# Patient Record
Sex: MALE | Race: WHITE | NOT HISPANIC OR LATINO | Employment: FULL TIME | URBAN - METROPOLITAN AREA
[De-identification: names, ages, dates, MRNs, and addresses within clinical notes are randomized per-mention and may not be internally consistent; named-entity substitution may affect disease eponyms.]

---

## 2020-11-13 ENCOUNTER — OFFICE VISIT (OUTPATIENT)
Dept: URGENT CARE | Facility: CLINIC | Age: 25
End: 2020-11-13
Payer: OTHER GOVERNMENT

## 2020-11-13 VITALS
HEART RATE: 70 BPM | RESPIRATION RATE: 18 BRPM | TEMPERATURE: 97.9 F | HEIGHT: 72 IN | OXYGEN SATURATION: 98 % | BODY MASS INDEX: 27.77 KG/M2 | WEIGHT: 205 LBS

## 2020-11-13 DIAGNOSIS — J06.9 UPPER RESPIRATORY TRACT INFECTION, UNSPECIFIED TYPE: Primary | ICD-10-CM

## 2020-11-13 DIAGNOSIS — Z20.822 EXPOSURE TO COVID-19 VIRUS: ICD-10-CM

## 2020-11-13 PROCEDURE — U0003 INFECTIOUS AGENT DETECTION BY NUCLEIC ACID (DNA OR RNA); SEVERE ACUTE RESPIRATORY SYNDROME CORONAVIRUS 2 (SARS-COV-2) (CORONAVIRUS DISEASE [COVID-19]), AMPLIFIED PROBE TECHNIQUE, MAKING USE OF HIGH THROUGHPUT TECHNOLOGIES AS DESCRIBED BY CMS-2020-01-R: HCPCS | Performed by: PHYSICIAN ASSISTANT

## 2020-11-13 PROCEDURE — G0382 LEV 3 HOSP TYPE B ED VISIT: HCPCS | Performed by: PHYSICIAN ASSISTANT

## 2020-11-15 LAB — SARS-COV-2 RNA SPEC QL NAA+PROBE: NOT DETECTED

## 2020-12-10 ENCOUNTER — NURSE TRIAGE (OUTPATIENT)
Dept: OTHER | Facility: OTHER | Age: 25
End: 2020-12-10

## 2020-12-10 DIAGNOSIS — Z11.59 SPECIAL SCREENING EXAMINATION FOR UNSPECIFIED VIRAL DISEASE: ICD-10-CM

## 2020-12-10 DIAGNOSIS — Z11.59 SPECIAL SCREENING EXAMINATION FOR UNSPECIFIED VIRAL DISEASE: Primary | ICD-10-CM

## 2020-12-10 PROCEDURE — U0003 INFECTIOUS AGENT DETECTION BY NUCLEIC ACID (DNA OR RNA); SEVERE ACUTE RESPIRATORY SYNDROME CORONAVIRUS 2 (SARS-COV-2) (CORONAVIRUS DISEASE [COVID-19]), AMPLIFIED PROBE TECHNIQUE, MAKING USE OF HIGH THROUGHPUT TECHNOLOGIES AS DESCRIBED BY CMS-2020-01-R: HCPCS | Performed by: FAMILY MEDICINE

## 2020-12-11 LAB — SARS-COV-2 RNA SPEC QL NAA+PROBE: DETECTED

## 2020-12-12 ENCOUNTER — TELEPHONE (OUTPATIENT)
Dept: INTERNAL MEDICINE CLINIC | Facility: CLINIC | Age: 25
End: 2020-12-12

## 2020-12-12 NOTE — RESULT ENCOUNTER NOTE
I spoke with Jacob Epperson and let him know that his COVID-19 swab was positive  Continue symptomatic treatment  Advised he implement home isolation measures including      Staying home  Stay in a specific "sick room" or area and away from other people or animals, including pets  Wear a mask when leaving your room  Use a separate bathroom, if available  Wipe down all commonly touched surfaces with household   Please schedule follow up video visit next week

## 2022-04-19 ENCOUNTER — HOSPITAL ENCOUNTER (EMERGENCY)
Facility: HOSPITAL | Age: 27
Discharge: HOME | End: 2022-04-19
Attending: EMERGENCY MEDICINE | Admitting: EMERGENCY MEDICINE
Payer: OTHER GOVERNMENT

## 2022-04-19 VITALS
TEMPERATURE: 97.3 F | RESPIRATION RATE: 17 BRPM | HEIGHT: 72 IN | WEIGHT: 197 LBS | HEART RATE: 75 BPM | OXYGEN SATURATION: 97 % | BODY MASS INDEX: 26.68 KG/M2 | SYSTOLIC BLOOD PRESSURE: 147 MMHG | DIASTOLIC BLOOD PRESSURE: 92 MMHG

## 2022-04-19 DIAGNOSIS — L73.9 FOLLICULITIS: Primary | ICD-10-CM

## 2022-04-19 PROCEDURE — 99284 EMERGENCY DEPT VISIT MOD MDM: CPT | Performed by: EMERGENCY MEDICINE

## 2022-04-19 PROCEDURE — 99282 EMERGENCY DEPT VISIT SF MDM: CPT

## 2022-04-19 RX ORDER — CEPHALEXIN 500 MG/1
500 CAPSULE ORAL EVERY 12 HOURS SCHEDULED
Qty: 14 CAPSULE | Refills: 0 | Status: SHIPPED | OUTPATIENT
Start: 2022-04-19 | End: 2022-04-26

## 2022-04-19 NOTE — ED PROVIDER NOTES
History  Chief Complaint   Patient presents with    Mass     pt c/o painful lump on chin for past 3 days  denies drainage/fevers     Patient complains of lump on shin since Sunday (3 days ago)  No pain there, no drainage  No redness or streaking  No fevers  History provided by:  Patient   used: No    Medical Problem  Location:  Right chin  Quality:  Lump  Severity:  Mild  Onset quality:  Gradual  Duration:  3 days  Timing:  Constant  Progression:  Unchanged  Chronicity:  New  Relieved by:  Nothing  Worsened by:  Nothing  Ineffective treatments:  None tried  Associated symptoms: no abdominal pain, no chest pain, no congestion, no cough, no diarrhea, no ear pain, no fatigue, no fever, no headaches, no loss of consciousness, no myalgias, no nausea, no rash, no rhinorrhea, no shortness of breath, no sore throat, no vomiting and no wheezing        None       Past Medical History:   Diagnosis Date    Known health problems: none        Past Surgical History:   Procedure Laterality Date    NO PAST SURGERIES         Family History   Problem Relation Age of Onset    No Known Problems Mother     No Known Problems Father      I have reviewed and agree with the history as documented  E-Cigarette/Vaping    E-Cigarette Use Current Every Day User      E-Cigarette/Vaping Substances    Nicotine Yes     THC No     CBD No     Flavoring Yes     Other No     Unknown No      Social History     Tobacco Use    Smoking status: Never Smoker    Smokeless tobacco: Never Used   Vaping Use    Vaping Use: Every day    Substances: Nicotine, Flavoring   Substance Use Topics    Alcohol use: Not Currently    Drug use: Not Currently       Review of Systems   Constitutional: Negative for chills, fatigue and fever  HENT: Negative for congestion, ear pain, hearing loss, rhinorrhea, sore throat, trouble swallowing and voice change  Eyes: Negative for pain and discharge     Respiratory: Negative for cough, shortness of breath and wheezing  Cardiovascular: Negative for chest pain and palpitations  Gastrointestinal: Negative for abdominal pain, blood in stool, constipation, diarrhea, nausea and vomiting  Genitourinary: Negative for dysuria, flank pain, frequency and hematuria  Musculoskeletal: Negative for joint swelling, myalgias, neck pain and neck stiffness  Skin: Negative for rash and wound  Neurological: Negative for dizziness, seizures, loss of consciousness, syncope, facial asymmetry and headaches  Psychiatric/Behavioral: Negative for hallucinations, self-injury and suicidal ideas  All other systems reviewed and are negative  Physical Exam  Physical Exam  Vitals and nursing note reviewed  Constitutional:       General: He is not in acute distress  Appearance: Normal appearance  He is well-developed  He is not ill-appearing or diaphoretic  HENT:      Head: Normocephalic and atraumatic  Right Ear: External ear normal       Left Ear: External ear normal    Eyes:      General: No scleral icterus  Right eye: No discharge  Left eye: No discharge  Extraocular Movements: Extraocular movements intact  Conjunctiva/sclera: Conjunctivae normal    Pulmonary:      Effort: Pulmonary effort is normal  No respiratory distress  Musculoskeletal:         General: No swelling or deformity  Normal range of motion  Cervical back: Normal range of motion and neck supple  Skin:     General: Skin is dry  Coloration: Skin is not jaundiced or pale  Findings: No rash  Comments: Local inflammation and swelling in the right shin where there is noted to be changes consistent with folliculitis  No fluctuant collection noted  No drainage noted  No redness or warmth  Neurological:      General: No focal deficit present  Mental Status: He is alert and oriented to person, place, and time  Cranial Nerves: No cranial nerve deficit        Motor: No weakness  Coordination: Coordination normal       Gait: Gait normal    Psychiatric:         Mood and Affect: Mood normal          Behavior: Behavior normal          Thought Content: Thought content normal          Judgment: Judgment normal          Vital Signs  ED Triage Vitals [04/19/22 1642]   Temperature Pulse Respirations Blood Pressure SpO2   (!) 97 3 °F (36 3 °C) 75 17 147/92 97 %      Temp Source Heart Rate Source Patient Position - Orthostatic VS BP Location FiO2 (%)   Temporal Monitor Sitting Right arm --      Pain Score       No Pain           Vitals:    04/19/22 1642   BP: 147/92   Pulse: 75   Patient Position - Orthostatic VS: Sitting         Visual Acuity      ED Medications  Medications - No data to display    Diagnostic Studies  Results Reviewed     None                 No orders to display              Procedures  Procedures         ED Course                                             MDM  Number of Diagnoses or Management Options  Diagnosis management comments: No indication at this time for incision and drainage  Will recommend oral antibiotic, warm compresses, outpatient follow-up as needed with primary care physician and or surgery  Disposition  Final diagnoses: Folliculitis     Time reflects when diagnosis was documented in both MDM as applicable and the Disposition within this note     Time User Action Codes Description Comment    4/19/2022  4:58 PM Jaimie Rodriges Add [J22 7] Folliculitis       ED Disposition     ED Disposition Condition Date/Time Comment    Discharge Stable Tue Apr 19, 2022  4:58 PM Shabbir Perez discharge to home/self care              Follow-up Information     Follow up With Specialties Details Why Contact Info    Your primary care physician   As needed     Sabino Leon DO General Surgery  As needed [address]  [phone]            Patient's Medications   Discharge Prescriptions    CEPHALEXIN (KEFLEX) 500 MG CAPSULE    Take 1 capsule (500 mg total) by mouth every 12 (twelve) hours for 7 days       Start Date: 4/19/2022 End Date: 4/26/2022       Order Dose: 500 mg       Quantity: 14 capsule    Refills: 0       No discharge procedures on file      PDMP Review     None          ED Provider  Electronically Signed by           Renetta Prescott MD  04/19/22 3056

## 2022-11-29 ENCOUNTER — HOSPITAL ENCOUNTER (EMERGENCY)
Facility: HOSPITAL | Age: 27
Discharge: HOME | End: 2022-11-29
Attending: EMERGENCY MEDICINE

## 2022-11-29 ENCOUNTER — APPOINTMENT (OUTPATIENT)
Dept: RADIOLOGY | Facility: HOSPITAL | Age: 27
End: 2022-11-29

## 2022-11-29 ENCOUNTER — TELEPHONE (OUTPATIENT)
Dept: PAIN MEDICINE | Facility: CLINIC | Age: 27
End: 2022-11-29

## 2022-11-29 VITALS
HEIGHT: 72 IN | OXYGEN SATURATION: 98 % | TEMPERATURE: 96.8 F | DIASTOLIC BLOOD PRESSURE: 90 MMHG | HEART RATE: 90 BPM | SYSTOLIC BLOOD PRESSURE: 135 MMHG | RESPIRATION RATE: 18 BRPM | BODY MASS INDEX: 26.68 KG/M2 | WEIGHT: 197 LBS

## 2022-11-29 DIAGNOSIS — M46.1 SACROILIITIS (HCC): Primary | ICD-10-CM

## 2022-11-29 DIAGNOSIS — M54.50 LOWER BACK PAIN: ICD-10-CM

## 2022-11-29 RX ORDER — PREDNISONE 20 MG/1
40 TABLET ORAL DAILY
Qty: 10 TABLET | Refills: 0 | Status: SHIPPED | OUTPATIENT
Start: 2022-11-29 | End: 2022-12-04

## 2022-11-29 RX ORDER — METHOCARBAMOL 500 MG/1
500 TABLET, FILM COATED ORAL 2 TIMES DAILY
Qty: 20 TABLET | Refills: 0 | Status: SHIPPED | OUTPATIENT
Start: 2022-11-29

## 2022-11-29 RX ORDER — LIDOCAINE 50 MG/G
1 PATCH TOPICAL DAILY
Qty: 7 PATCH | Refills: 0 | Status: SHIPPED | OUTPATIENT
Start: 2022-11-29 | End: 2022-12-06

## 2022-11-29 RX ORDER — NAPROXEN 500 MG/1
500 TABLET ORAL 2 TIMES DAILY WITH MEALS
Qty: 30 TABLET | Refills: 0 | Status: SHIPPED | OUTPATIENT
Start: 2022-11-29

## 2022-11-29 NOTE — TELEPHONE ENCOUNTER
Patient called to schedule but Francisco Oscar is out of network with his insurance and I stated that he could call and schedule at a regular [facility]

## 2022-11-29 NOTE — ED PROVIDER NOTES
History  Chief Complaint   Patient presents with   • Back Pain     Lower back pain      Patient states that he woke up Sunday morning with a sore lower back  Has been getting progressively worse  No radiation of the pain  Worse with bending and turning  Tylenol without relief  No loss of bladder or bowel functions  No chronic steroid use  Not diabetic  No IV drug abuse  History provided by:  Patient   used: No    Back Pain  Location:  Sacro-iliac joint and lumbar spine  Quality:  Aching  Radiates to:  Does not radiate  Pain severity:  Mild  Pain is:  Same all the time  Onset quality:  Gradual  Duration:  2 days  Timing:  Constant  Progression:  Worsening  Chronicity:  New  Context: not jumping from heights, not occupational injury and not pedestrian accident    Relieved by:  Nothing  Worsened by:  Bending  Ineffective treatments:  OTC medications  Associated symptoms: no abdominal pain, no bladder incontinence, no bowel incontinence, no chest pain, no dysuria, no fever, no headaches, no numbness, no perianal numbness and no tingling        None       Past Medical History:   Diagnosis Date   • Known health problems: none        Past Surgical History:   Procedure Laterality Date   • NO PAST SURGERIES         Family History   Problem Relation Age of Onset   • No Known Problems Mother    • No Known Problems Father      I have reviewed and agree with the history as documented  E-Cigarette/Vaping   • E-Cigarette Use Current Every Day User      E-Cigarette/Vaping Substances   • Nicotine Yes    • THC No    • CBD No    • Flavoring Yes    • Other No    • Unknown No      Social History     Tobacco Use   • Smoking status: Never   • Smokeless tobacco: Never   Vaping Use   • Vaping Use: Every day   • Substances: Nicotine, Flavoring   Substance Use Topics   • Alcohol use: Not Currently   • Drug use: Not Currently       Review of Systems   Constitutional: Negative for chills and fever     HENT: Negative for ear pain, hearing loss, sore throat, trouble swallowing and voice change  Eyes: Negative for pain and discharge  Respiratory: Negative for cough, shortness of breath and wheezing  Cardiovascular: Negative for chest pain and palpitations  Gastrointestinal: Negative for abdominal pain, blood in stool, bowel incontinence, constipation, diarrhea, nausea and vomiting  Genitourinary: Negative for bladder incontinence, dysuria, flank pain, frequency and hematuria  Musculoskeletal: Positive for back pain  Negative for joint swelling, neck pain and neck stiffness  Skin: Negative for rash and wound  Neurological: Negative for dizziness, tingling, seizures, syncope, facial asymmetry, numbness and headaches  Psychiatric/Behavioral: Negative for hallucinations, self-injury and suicidal ideas  All other systems reviewed and are negative  Physical Exam  Physical Exam  Vitals and nursing note reviewed  Constitutional:       General: He is not in acute distress  Appearance: He is well-developed  HENT:      Head: Normocephalic and atraumatic  Right Ear: External ear normal       Left Ear: External ear normal    Eyes:      General: No scleral icterus  Right eye: No discharge  Left eye: No discharge  Extraocular Movements: Extraocular movements intact  Conjunctiva/sclera: Conjunctivae normal    Cardiovascular:      Rate and Rhythm: Normal rate and regular rhythm  Heart sounds: Normal heart sounds  No murmur heard  Pulmonary:      Effort: Pulmonary effort is normal       Breath sounds: Normal breath sounds  No wheezing or rales  Abdominal:      General: Bowel sounds are normal  There is no distension  Palpations: Abdomen is soft  Tenderness: There is no abdominal tenderness  There is no guarding or rebound  Musculoskeletal:         General: Tenderness present  No deformity  Normal range of motion        Cervical back: Normal range of motion and neck supple  Comments: Tender along the sacroiliac region  Skin:     General: Skin is warm and dry  Findings: No rash  Neurological:      General: No focal deficit present  Mental Status: He is alert and oriented to person, place, and time  Cranial Nerves: No cranial nerve deficit  Psychiatric:         Mood and Affect: Mood normal          Behavior: Behavior normal          Thought Content: Thought content normal          Judgment: Judgment normal          Vital Signs  ED Triage Vitals [11/29/22 1123]   Temperature Pulse Respirations Blood Pressure SpO2   (!) 96 8 °F (36 °C) 90 18 135/90 98 %      Temp Source Heart Rate Source Patient Position - Orthostatic VS BP Location FiO2 (%)   Temporal Monitor -- Left arm --      Pain Score       8           Vitals:    11/29/22 1123   BP: 135/90   Pulse: 90         Visual Acuity  Visual Acuity    Flowsheet Row Most Recent Value   L Pupil Size (mm) 5   R Pupil Size (mm) 5          ED Medications  Medications - No data to display    Diagnostic Studies  Results Reviewed     None                 XR spine lumbar 2 or 3 views injury   ED Interpretation by Uriah Hollins MD (11/29 1219)   No fracture                 Procedures  Procedures         ED Course                               SBIRT 22yo+    Flowsheet Row Most Recent Value   SBIRT (23 yo +)    In order to provide better care to our patients, we are screening all of our patients for alcohol and drug use  Would it be okay to ask you these screening questions? Yes Filed at: 11/29/2022 1217   Initial Alcohol Screen: US AUDIT-C     1  How often do you have a drink containing alcohol? 3 Filed at: 11/29/2022 1217   2  How many drinks containing alcohol do you have on a typical day you are drinking? 0 Filed at: 11/29/2022 1217   3a  Male UNDER 65: How often do you have five or more drinks on one occasion?  0 Filed at: 11/29/2022 1217   Audit-C Score 3 Filed at: 11/29/2022 1217   NERY: How many times in the past year have you    Used an illegal drug or used a prescription medication for non-medical reasons? Never Filed at: 11/29/2022 1217                    MDM    Disposition  Final diagnoses:   Sacroiliitis ([address] )   Lower back pain     Time reflects when diagnosis was documented in both MDM as applicable and the Disposition within this note     Time User Action Codes Description Comment    11/29/2022 12:21 PM Gerardo Gan Add [M46 1] Sacroiliitis ([address] )     11/29/2022 12:21 PM Smiley Abraham Add [M54 50] Lower back pain       ED Disposition     ED Disposition   Discharge    Condition   Stable    Date/Time   Tue Nov 29, 2022 12:21 PM    Comment   Shana Zapata discharge to home/self care  Follow-up Information     Follow up With Specialties Details Why Erasto Canas MD Pain Medicine Call in 1 day  [id number] [address] [employer]            Patient's Medications   Discharge Prescriptions    LIDOCAINE (LIDODERM) 5 %    Apply 1 patch topically daily for 7 days Apply to area of pain  Remove & Discard patch within 12 hours or as directed by MD       Start Date: 11/29/2022End Date: 12/6/2022       Order Dose: 1 patch       Quantity: 7 patch    Refills: 0    METHOCARBAMOL (ROBAXIN) 500 MG TABLET    Take 1 tablet (500 mg total) by mouth 2 (two) times a day       Start Date: 11/29/2022End Date: --       Order Dose: 500 mg       Quantity: 20 tablet    Refills: 0    NAPROXEN (NAPROSYN) 500 MG TABLET    Take 1 tablet (500 mg total) by mouth 2 (two) times a day with meals       Start Date: 11/29/2022End Date: --       Order Dose: 500 mg       Quantity: 30 tablet    Refills: 0    PREDNISONE 20 MG TABLET    Take 2 tablets (40 mg total) by mouth daily for 5 days       Start Date: 11/29/2022End Date: 12/4/2022       Order Dose: 40 mg       Quantity: 10 tablet    Refills: 0       No discharge procedures on file      PDMP Review     None          ED Provider  Electronically Signed by           Benjamin Weber MD  11/29/22 9315

## 2022-11-29 NOTE — Clinical Note
Billie Stone was seen and treated in our emergency department on 11/29/2022  Diagnosis:     Jenny Mosher  may return to work on return date  He may return on this date: 12/02/2022         If you have any questions or concerns, please don't hesitate to call        Luis Mercer MD    ______________________________           _______________          _______________  Hospital Representative                              Date                                Time

## 2022-11-30 NOTE — TELEPHONE ENCOUNTER
Caller: Patient    Doctor: none    Reason for call:     Patient calling for appointment, he has [facility], he will call and check with ins first to see if referral is needed and check list of dr in his network      Call back#: [phone]

## 2022-12-03 ENCOUNTER — HOSPITAL ENCOUNTER (EMERGENCY)
Facility: HOSPITAL | Age: 27
Discharge: HOME | End: 2022-12-03
Attending: EMERGENCY MEDICINE

## 2022-12-03 VITALS
DIASTOLIC BLOOD PRESSURE: 106 MMHG | OXYGEN SATURATION: 97 % | RESPIRATION RATE: 20 BRPM | TEMPERATURE: 97.6 F | SYSTOLIC BLOOD PRESSURE: 150 MMHG | HEART RATE: 88 BPM

## 2022-12-03 DIAGNOSIS — M54.50 ACUTE BILATERAL LOW BACK PAIN WITHOUT SCIATICA: Primary | ICD-10-CM

## 2022-12-03 RX ORDER — CYCLOBENZAPRINE HCL 5 MG
5 TABLET ORAL 3 TIMES DAILY PRN
Qty: 30 TABLET | Refills: 0 | Status: SHIPPED | OUTPATIENT
Start: 2022-12-03 | End: 2022-12-13

## 2022-12-03 RX ORDER — KETOROLAC TROMETHAMINE 30 MG/ML
15 INJECTION, SOLUTION INTRAMUSCULAR; INTRAVENOUS ONCE
Status: COMPLETED | OUTPATIENT
Start: 2022-12-03 | End: 2022-12-03

## 2022-12-03 RX ADMIN — KETOROLAC TROMETHAMINE 15 MG: 30 INJECTION, SOLUTION INTRAMUSCULAR at 04:25

## 2022-12-03 NOTE — ED PROVIDER NOTES
History  Chief Complaint   Patient presents with   • Back Pain     Pt reports lower back pain for the last week, seen previously in this ER for the same but states pain is getting worse     HPI   27M presenting with lower back pain  For the past week, patient has been having lower back pain  He woke up that morning with soreness in his lower back  He was seen in the ED 4 days ago, and was prescribed steroid, muscle relaxer, and naproxen  Endorses compliance w medications  Pain has persisted since then; no worsening of pain but pain not improving  Pain described as burning, constant sensation  Exacerbated by bending over and leaning forward  Has appt with ortho spine on 12/20  Denies leg weakness/numbness, bowel/bladder dysfunction, saddle anesthesia, fevers, IVDU, previous back surgeries or injuries  Prior to Admission Medications   Prescriptions Last Dose Informant Patient Reported? Taking?   lidocaine (Lidoderm) 5 %   No No   Sig: Apply 1 patch topically daily for 7 days Apply to area of pain  Remove & Discard patch within 12 hours or as directed by MD   methocarbamol (ROBAXIN) 500 mg tablet   No No   Sig: Take 1 tablet (500 mg total) by mouth 2 (two) times a day   naproxen (Naprosyn) 500 mg tablet   No No   Sig: Take 1 tablet (500 mg total) by mouth 2 (two) times a day with meals   predniSONE 20 mg tablet   No No   Sig: Take 2 tablets (40 mg total) by mouth daily for 5 days      Facility-Administered Medications: None       Past Medical History:   Diagnosis Date   • Known health problems: none        Past Surgical History:   Procedure Laterality Date   • NO PAST SURGERIES         Family History   Problem Relation Age of Onset   • No Known Problems Mother    • No Known Problems Father      I have reviewed and agree with the history as documented      E-Cigarette/Vaping   • E-Cigarette Use Current Every Day User      E-Cigarette/Vaping Substances   • Nicotine Yes    • THC No    • CBD No    • Flavoring Yes • Other No    • Unknown No      Social History     Tobacco Use   • Smoking status: Never   • Smokeless tobacco: Never   Vaping Use   • Vaping Use: Every day   • Substances: Nicotine, Flavoring   Substance Use Topics   • Alcohol use: Not Currently   • Drug use: Not Currently       Review of Systems   Constitutional: Negative for chills and fever  HENT: Negative for ear pain and sore throat  Eyes: Negative for pain and visual disturbance  Respiratory: Negative for cough and shortness of breath  Cardiovascular: Negative for chest pain and palpitations  Gastrointestinal: Negative for abdominal pain and vomiting  Genitourinary: Negative for dysuria and hematuria  Musculoskeletal: Positive for back pain  Negative for arthralgias  Skin: Negative for color change and rash  Neurological: Negative for seizures and syncope  All other systems reviewed and are negative  Physical Exam  Physical Exam  Vitals and nursing note reviewed  Constitutional:       Appearance: He is well-developed  HENT:      Head: Normocephalic and atraumatic  Right Ear: External ear normal       Left Ear: External ear normal       Nose: Nose normal    Eyes:      Conjunctiva/sclera: Conjunctivae normal    Cardiovascular:      Rate and Rhythm: Normal rate and regular rhythm  Pulmonary:      Effort: Pulmonary effort is normal  No respiratory distress  Breath sounds: Normal breath sounds  Abdominal:      Palpations: Abdomen is soft  Tenderness: There is no abdominal tenderness  Musculoskeletal:         General: Tenderness present  Cervical back: Normal range of motion and neck supple  Comments: Mild tenderness along b/l sacroiliac region   Skin:     General: Skin is warm and dry  Neurological:      General: No focal deficit present  Mental Status: He is alert  Mental status is at baseline  Sensory: Sensation is intact  Motor: Motor function is intact        Coordination: Coordination is intact  Vital Signs  ED Triage Vitals   Temperature Pulse Respirations Blood Pressure SpO2   12/03/22 0402 12/03/22 0402 12/03/22 0402 12/03/22 0402 12/03/22 0402   97 6 °F (36 4 °C) 88 20 (!) 150/106 97 %      Temp Source Heart Rate Source Patient Position - Orthostatic VS BP Location FiO2 (%)   12/03/22 0402 12/03/22 0402 12/03/22 0402 12/03/22 0402 --   Temporal Monitor Sitting Right arm       Pain Score       12/03/22 0425       8           Vitals:    12/03/22 0402   BP: (!) 150/106   Pulse: 88   Patient Position - Orthostatic VS: Sitting         Visual Acuity      ED Medications  Medications   ketorolac (TORADOL) injection 15 mg (15 mg Intramuscular Given 12/3/22 0425)       Diagnostic Studies  Results Reviewed     None              No orders to display          Procedures  Procedures     ED Course       SBIRT 20yo+    Flowsheet Row Most Recent Value   SBIRT (23 yo +)    In order to provide better care to our patients, we are screening all of our patients for alcohol and drug use  Would it be okay to ask you these screening questions? No Filed at: 12/03/2022 0428        MDM  27M presenting with lower back pain  Seen here a few days ago  No red flag symptoms for back pain  +sensations and strength intact  Advised different medication regimen for back pain  Recommended keeping appt with ortho spine  Discharged home in stable condition  Ambulatory gait  Disposition  Final diagnoses:   Acute bilateral low back pain without sciatica     Time reflects when diagnosis was documented in both MDM as applicable and the Disposition within this note     Time User Action Codes Description Comment    12/3/2022  4:23 AM Venecia Chavarria Add [M54 50] Acute bilateral low back pain without sciatica       ED Disposition     ED Disposition   Discharge    Condition   Stable    Date/Time   Sat Dec 3, 2022  4:23 AM    Comment   Joan Venegas discharge to home/self care                 Follow-up Information None         Discharge Medication List as of 12/3/2022  4:26 AM      START taking these medications    Details   cyclobenzaprine (FLEXERIL) 5 mg tablet Take 1 tablet (5 mg total) by mouth 3 (three) times a day as needed for muscle spasms for up to 10 days, Starting Sat 12/3/2022, Until Tue 12/13/2022 at 2359, Normal         CONTINUE these medications which have NOT CHANGED    Details   lidocaine (Lidoderm) 5 % Apply 1 patch topically daily for 7 days Apply to area of pain  Remove & Discard patch within 12 hours or as directed by MD, Starting Tue 11/29/2022, Until Tue 12/6/2022, Normal      methocarbamol (ROBAXIN) 500 mg tablet Take 1 tablet (500 mg total) by mouth 2 (two) times a day, Starting Tue 11/29/2022, Normal      naproxen (Naprosyn) 500 mg tablet Take 1 tablet (500 mg total) by mouth 2 (two) times a day with meals, Starting Tue 11/29/2022, Normal      predniSONE 20 mg tablet Take 2 tablets (40 mg total) by mouth daily for 5 days, Starting Tue 11/29/2022, Until Sun 12/4/2022, Normal             No discharge procedures on file      PDMP Review     None          ED Provider  Electronically Signed by           Henretta Riedel, MD  12/03/22 0540

## 2022-12-03 NOTE — DISCHARGE INSTRUCTIONS
You were seen today for back pain  We gave you a shot of toradol  Your muscle relaxer has been changed to flexeril; see if this medication gives you more relief than the other muscle relaxer  I would stop the other muscle relaxer if you are taking this one instead  Use Voltaren gel for your back pain and add Tylenol to your regimen as well  Do NOT take more than 3 grams of Tylenol in one day  If you develop inability to feel your legs, walk, pooping or peeing yourself, then you NEED to see a healthcare provider or got to an ED  How is back pain treated? Most people with an episode of low back pain do not have a serious medical problem, and can try simple treatments such as:  -Staying active - The best thing you can do is to stay as active as possible  People with low back pain recover faster if they stay active  If your pain is severe, you might need to rest for a day or 2  But it's important to get back to walking and moving as soon as possible  While you should avoid heavy lifting and sports while your back hurts, try to keep doing your normal daily activities   -Heat - Some people find that it helps to use a heating pad or heated wrap  Be careful to avoid high heat settings to prevent skin burns  -Medicines - First, you can try pain medicines that you can get without a prescription  In many cases, doctors suggest first trying a nonsteroidal antiinflammatory drug, or "NSAID " NSAIDs include ibuprofen (sample brand names: Advil, Motrin) and naproxen (sample brand name: Aleve)  These might work better than acetaminophen (Tylenol) for back pain  While back pain usually goes away within a few weeks, some people do continue to have pain for longer  In this case, additional treatments might include:  -Self care - This involves being aware of your pain  While you should rest when you need to, it's important to stay active as much as you can   Things like applying heat and doing gentle stretches can help you feel better, too   -Physical therapy - A physical therapist is an exercise expert who can teach you stretches and movements to help strengthen your muscles  The goal is to relieve pain but also help you get back to your normal activities  Exercises you can try include walking, swimming, or using an exercise bike  Some people also find that [address] or yoga can help with their back pain  Finding activities you enjoy can help you stay active  -Reducing stress - Some people find that it helps to try something called "mindfulness-based stress reduction " This involves going to a group program to practice relaxation and meditation  If your back pain is making you feel anxious or depressed, talk to your doctor or nurse  There are other treatments that can help with these problems  Only a small number of people end up needing surgery to treat back pain  What can I do to keep from getting back pain again? The best thing you can do is to stay active  Doing exercises to strengthen and stretch your back can help  You can also:  -Learn to lift using your legs instead of your back  -Avoid sitting or standing in the same position for too long    Having back pain can be frustrating and scary  But it can help to know that doing these things can lower your risk of having another episode

## 2022-12-12 ENCOUNTER — OFFICE VISIT (OUTPATIENT)
Dept: FAMILY MEDICINE CLINIC | Facility: CLINIC | Age: 27
End: 2022-12-12

## 2022-12-12 VITALS
BODY MASS INDEX: 27.77 KG/M2 | DIASTOLIC BLOOD PRESSURE: 78 MMHG | SYSTOLIC BLOOD PRESSURE: 120 MMHG | HEART RATE: 87 BPM | WEIGHT: 205 LBS | HEIGHT: 72 IN | TEMPERATURE: 97.5 F | OXYGEN SATURATION: 98 %

## 2022-12-12 DIAGNOSIS — M54.50 ACUTE BILATERAL LOW BACK PAIN WITHOUT SCIATICA: ICD-10-CM

## 2022-12-12 DIAGNOSIS — Z00.00 ANNUAL PHYSICAL EXAM: Primary | ICD-10-CM

## 2022-12-12 DIAGNOSIS — Z13.220 LIPID SCREENING: ICD-10-CM

## 2022-12-12 RX ORDER — ALBUTEROL SULFATE 90 UG/1
AEROSOL, METERED RESPIRATORY (INHALATION)
COMMUNITY

## 2022-12-12 NOTE — PROGRESS NOTES
BMI Counseling: Body mass index is 27 8 kg/m²  The BMI is above normal  Nutrition recommendations include decreasing portion sizes and encouraging healthy choices of fruits and vegetables  Rationale for BMI follow-up plan is due to patient being overweight or obese  Depression Screening and Follow-up Plan: Patient was screened for depression during today's encounter  They screened negative with a PHQ-2 score of 0  Assessment/Plan:       1  Annual physical exam  Assessment & Plan:  Reviewed age-appropriate health maintenance and preventive care  Check labs      2  Acute bilateral low back pain without sciatica  Comments:  continue flexeril  ref to PT  Orders:  -     Ambulatory Referral to Physical Therapy; Future        Subjective:      Patient ID: Farhana Martinez is a 33 y o  male  [place] is here for his initial visit  He is a very healthy and pleasant 78-year-old man  He was recently in the emergency room twice with low back pain without any incident that caused it  No sciatica no saddle anesthesia or incontinence  He is on Flexeril and an anti-inflammatory  He also completed a steroid  It is gradually getting better he rates it as 4 out of 10 in intensity and worse with leaning backwards  He has been to try some physical therapy  He is due for annual blood work  He does not smoke  He understands importance of healthy diet and exercise  He is healthy in general takes no chronic medications  The following portions of the patient's history were reviewed and updated as appropriate: allergies, current medications, past family history, past medical history, past social history, past surgical history, and problem list     Review of Systems   Respiratory: Negative  Cardiovascular: Negative  Gastrointestinal: Negative            Objective:      /78 (BP Location: Right arm, Patient Position: Sitting, Cuff Size: Standard)   Pulse 87   Temp 97 5 °F (36 4 °C)   Ht 6' (1 829 m)   [employer] 93 kg (205 lb)   SpO2 98%   BMI 27 80 kg/m²          Physical Exam  Vitals and nursing note reviewed  Constitutional:       Appearance: Normal appearance  HENT:      Head: Normocephalic and atraumatic  Nose: Nose normal       Mouth/Throat:      Mouth: Mucous membranes are moist    Eyes:      Extraocular Movements: Extraocular movements intact  Pupils: Pupils are equal, round, and reactive to light  Cardiovascular:      Rate and Rhythm: Normal rate and regular rhythm  Pulses: Normal pulses  Pulmonary:      Effort: Pulmonary effort is normal       Breath sounds: Normal breath sounds  Abdominal:      General: Bowel sounds are normal       Palpations: Abdomen is soft  Musculoskeletal:      Cervical back: Normal range of motion  Comments: B/l lumbar spasm   Skin:     General: Skin is warm and dry  Capillary Refill: Capillary refill takes less than 2 seconds  Neurological:      General: No focal deficit present  Mental Status: He is alert     Psychiatric:         Mood and Affect: Mood normal

## 2024-12-10 ENCOUNTER — TELEPHONE (OUTPATIENT)
Dept: FAMILY MEDICINE CLINIC | Facility: CLINIC | Age: 29
End: 2024-12-10